# Patient Record
Sex: FEMALE
[De-identification: names, ages, dates, MRNs, and addresses within clinical notes are randomized per-mention and may not be internally consistent; named-entity substitution may affect disease eponyms.]

---

## 2020-02-24 ENCOUNTER — APPOINTMENT (OUTPATIENT)
Dept: SURGERY | Facility: CLINIC | Age: 65
End: 2020-02-24
Payer: COMMERCIAL

## 2020-02-24 VITALS
BODY MASS INDEX: 21.79 KG/M2 | SYSTOLIC BLOOD PRESSURE: 124 MMHG | HEART RATE: 85 BPM | HEIGHT: 63 IN | WEIGHT: 123 LBS | DIASTOLIC BLOOD PRESSURE: 82 MMHG | OXYGEN SATURATION: 98 % | TEMPERATURE: 98.6 F

## 2020-02-24 PROBLEM — Z00.00 ENCOUNTER FOR PREVENTIVE HEALTH EXAMINATION: Status: ACTIVE | Noted: 2020-02-24

## 2020-02-24 PROCEDURE — 99203 OFFICE O/P NEW LOW 30 MIN: CPT

## 2020-02-27 ENCOUNTER — APPOINTMENT (OUTPATIENT)
Dept: PLASTIC SURGERY | Facility: CLINIC | Age: 65
End: 2020-02-27
Payer: COMMERCIAL

## 2020-02-27 VITALS — OXYGEN SATURATION: 97 % | WEIGHT: 123 LBS | HEART RATE: 74 BPM | HEIGHT: 63 IN | BODY MASS INDEX: 21.79 KG/M2

## 2020-02-27 PROCEDURE — 99205 OFFICE O/P NEW HI 60 MIN: CPT

## 2020-02-28 NOTE — HISTORY OF PRESENT ILLNESS
[FreeTextEntry1] : 63 y/o F with newly diagnosed recurrent right breast cancer s/p b/l mastectomy with TE to implant reconstruction with textured silicone submuscular implants in 2014 is here to discuss removal of textured implants and capsulectomy and treatmetn of recurrent cancer.\par \par B/L implants in position, well healed transverse mastectomy scars, nipple reconstruction with tattoo b/l, no no seroma, no erythema, no induration\par \par Today we discussed options for removal of textured implants and capsulectomy with and without replacement of implants. We also discussed option for conversion to autologous tissue reconstruction. Patient is not a good candidate for autologous tissue reconstruction due to lack of donor site. She is most interested in removal of texture implants and capsulectomy without implant exchange. Nature of the surgery, risks, benefits, alternatives, expected postoperative course, recovery and long term results were reviewed. Patient motivated to proceed with surgery. Will coordinate with Dr. Wei. \par

## 2020-03-02 NOTE — HISTORY OF PRESENT ILLNESS
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 She is s/p b/l mastectomy, right SLNB (two sentinel lymph nodes were negative) with TE to implant reconstruction (Textured implants) in 2014. She palpated a right breast mass a few weeks ago and underwent an US guided right breast 12:00 4cmFN biopsy pathology revealed invasive poorly differentiated ductal carcinoma ER >90%, AK negative, HER-2 equivocal FISH negative.\par She reports 3 year h/o taking Arimidex, Femara, Tamoxifen but discontinued due to side effects. \par \par To note, patient is followed by Dr. Jojo Richardson (Med Onc at [facility]) and reports she had followed up with Dr. Richardson one day prior to her diagnosis of recurrence of breast cancer. She reports Dr. Richardson is aware of new diagnosis. Discussed recommendation for patient to follow up with her medical oncologist to determine if any further workup is indicated. \par \par Oncotype score was completed in 2014 with a score of 26, distant recurrence of 17%

## 2020-03-02 NOTE — DATA REVIEWED
[FreeTextEntry1] : 1/15/2020 ([facility])-- right 12:00 4cmFN US core biopsy pathology invasive ductal carcinoma, poorly differentiated \par \par 1/20/20 ([facility]) breast MRI showing left breast enhancing focus for which second look US recommended with biopsy of any suspicious US findings. If no US finding then clip placement under MRI guidance is recommended with surgical excision to follow. BIRADS 4 \par \par 1/28/20 ([facility]) PET chest abdomen and pelvis showing small focus of mild abnormal uptake in right breast abutting the surgical clip likely related to patients known malignancy. Mildly metabolic sub-CM left internal mammary lymph node that needs attention on follow up regarding further characterization. Suggestion of tiny left upper lobe nodule that is not fully evaluated in this examination. Further characterization is suggested with diagnostic chest CT. \par \par 1/30/20 ([facility]) Chest CT showing scattered indeterminate subcentimeter pulmonary nodules including a 2mm PAMELA nodule abutting the left fissure which was seen on prior PET/CT. Other subcentimeter nodules were difficult to visualized on prior PET/CT but were likely present. Continue CT surveillance recommended as per clinical protocol. A mildly hypermetabolic left internal thoracic lymph node was also better visualized on PET/CT. \par \par \par 2/19/20 ([facility]) right breast US showing right 12:00 4mcFN 8 x 5 x 7mm heterogeneous solid mass, unchanged in size and appearance. this is known biopsy proven carcinoma. Right axilla level 1 measuring 8 x 3 x 5mm lymph node present, likely a reactive lymph node. BIRADS 6

## 2020-03-02 NOTE — PAST MEDICAL HISTORY
[Menarche Age ____] : age at menarche was [unfilled] [Postmenopausal] : The patient is postmenopausal [Approximately ___] : the LMP was approximately [unfilled] [Total Preg ___] : G[unfilled] [Menopause Age____] : age at menopause was [unfilled] [Live Births ___] : P[unfilled]  [AB Spont ___] : miscarriages: [unfilled]  [History of Hormone Replacement Treatment] : has no history of hormone replacement treatment [FreeTextEntry5] : None [FreeTextEntry2] : 1 son and 1 daughter [FreeTextEntry6] : None [FreeTextEntry7] : None [FreeTextEntry8] : 6 months each child

## 2021-02-16 ENCOUNTER — TRANSCRIPTION ENCOUNTER (OUTPATIENT)
Age: 66
End: 2021-02-16

## 2021-04-06 ENCOUNTER — APPOINTMENT (OUTPATIENT)
Dept: PLASTIC SURGERY | Facility: CLINIC | Age: 66
End: 2021-04-06

## 2021-04-12 ENCOUNTER — TRANSCRIPTION ENCOUNTER (OUTPATIENT)
Age: 66
End: 2021-04-12

## 2021-04-19 ENCOUNTER — NON-APPOINTMENT (OUTPATIENT)
Age: 66
End: 2021-04-19

## 2021-04-27 ENCOUNTER — APPOINTMENT (OUTPATIENT)
Dept: PLASTIC SURGERY | Facility: CLINIC | Age: 66
End: 2021-04-27
Payer: COMMERCIAL

## 2021-04-27 VITALS — BODY MASS INDEX: 21.79 KG/M2 | HEART RATE: 73 BPM | WEIGHT: 123 LBS | OXYGEN SATURATION: 98 % | HEIGHT: 63 IN

## 2021-04-27 DIAGNOSIS — N65.0 DEFORMITY OF RECONSTRUCTED BREAST: ICD-10-CM

## 2021-04-27 DIAGNOSIS — Z85.3 PERSONAL HISTORY OF MALIGNANT NEOPLASM OF BREAST: ICD-10-CM

## 2021-04-27 PROCEDURE — 99072 ADDL SUPL MATRL&STAF TM PHE: CPT

## 2021-04-27 PROCEDURE — 99214 OFFICE O/P EST MOD 30 MIN: CPT

## 2021-04-27 RX ORDER — EXEMESTANE 25 MG/1
25 TABLET, FILM COATED ORAL
Refills: 0 | Status: ACTIVE | COMMUNITY

## 2021-04-27 NOTE — HISTORY OF PRESENT ILLNESS
[FreeTextEntry1] : 65 y/o F recurrent right breast cancer s/p b/l mastectomy with TE to implant reconstruction with textured silicone submuscular implants in 2014 at [facility]. She had recurrence of her breast cancer last year and had her implants removed 3/3/20 with TE recon at [facility] followed by radiation which she comppleted in June 2020. She had two expansions most recently was in February 2021. She had XRT to right breast. She is here today to discuss removal of TE and would like to hear options of reconstruction and without reconstruction. She has reconstructed nipples that have been tattooed. On Aromasin for ? 5 years.\par \par \par

## 2021-04-27 NOTE — ASSESSMENT
[FreeTextEntry1] : I reviewed with Ms. Sosa in detail the risks, benefits, and alternatives of both revision of implant based breast reconstruction, as well as delayed autologous tissue reconstruction.\par \par She has high grade capsular contracture and associated pain, discomfort. We discussed option of implant removal vs delayed autologous recon. \par \par I reviewed with her the details of delayed autologous tissue reconstruction, specifically using a free flap from her buttocks or inner thigh, such as a profunda artery  (PAP) flap reconstruction. The benefits of converting to autologous reconstruction are that it obviates the risk of future implant related complications. It also repositions the pectoralis muscle back onto the chest wall and creates a breast mound that will look more natural, feel more natural, and last forever without any concern for capsule contracture. Many patients with pain and discomfort from capsule contracture after radiation report improvement in their symptoms after delayed autologous reconstruction.\par \par I explained to her that the PAP flap entails transplanting the skin, the fat, and the blood vessels from the upper posterior thigh/inferior gluteal crease region to the chest wall, where we reconnect the artery and the vein using microvascular surgical techniques. This operation is approximately six hour for one side, and nine hours for bilateral reconstruction. There is a three day hospitalization and a six week recovery period. The risks include free flap failure, vascular compromise requiring return to the operating room, donor site scarring and potential delayed wound healing, wound dehiscence, suboptimal scarring or asymmetry associated with donor site.  There is also a possibility of contour abnormality and asymmetry between the two breasts. Finally, nipple areola reconstruction is performed at a later date as a separate procedure. If there is a free flap loss, we would likely place a tissue expander at the time returning to the operating room in order to preserve the breast skin envelope and perform a delayed reconstruction.\par

## 2021-04-27 NOTE — PHYSICAL EXAM
[de-identified] : b/l implants in place, b/l reconstructed nipples, well healed scar right breast from nipple recon, radiation induced fibrosis, left breast well healed scar [de-identified] : ND, NT, no masses, inadequate tissue for donor site  [de-identified] : adequate tissue for donor site, +skin laxity, +adipose tissue

## 2022-04-20 ENCOUNTER — TRANSCRIPTION ENCOUNTER (OUTPATIENT)
Age: 67
End: 2022-04-20

## 2023-10-04 ENCOUNTER — OFFICE (OUTPATIENT)
Dept: URBAN - METROPOLITAN AREA CLINIC 8 | Facility: CLINIC | Age: 68
Setting detail: OPHTHALMOLOGY
End: 2023-10-04
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
## 2023-10-04 PROCEDURE — NO SHOW FE NO SHOW FEE: Performed by: OPHTHALMOLOGY

## 2023-10-20 ENCOUNTER — OFFICE (OUTPATIENT)
Dept: URBAN - METROPOLITAN AREA CLINIC 8 | Facility: CLINIC | Age: 68
Setting detail: OPHTHALMOLOGY
End: 2023-10-20
Payer: MEDICARE

## 2023-10-20 DIAGNOSIS — H52.4: ICD-10-CM

## 2023-10-20 DIAGNOSIS — H25.13: ICD-10-CM

## 2023-10-20 DIAGNOSIS — Q10.7: ICD-10-CM

## 2023-10-20 DIAGNOSIS — H57.813: ICD-10-CM

## 2023-10-20 DIAGNOSIS — H02.834: ICD-10-CM

## 2023-10-20 DIAGNOSIS — H02.831: ICD-10-CM

## 2023-10-20 PROCEDURE — 92014 COMPRE OPH EXAM EST PT 1/>: CPT

## 2023-10-20 PROCEDURE — 92015 DETERMINE REFRACTIVE STATE: CPT

## 2023-10-20 ASSESSMENT — SPHEQUIV_DERIVED
OS_SPHEQUIV: 2.125
OD_SPHEQUIV: 1.625
OD_SPHEQUIV: 1.125
OS_SPHEQUIV: 1.625
OS_SPHEQUIV: 1.625
OD_SPHEQUIV: 1.125

## 2023-10-20 ASSESSMENT — KERATOMETRY
OS_K2POWER_DIOPTERS: 46.00
OS_AXISANGLE_DEGREES: 085
OD_K2POWER_DIOPTERS: 45.75
METHOD_AUTO_MANUAL: AUTO
OD_K1POWER_DIOPTERS: 45.25
OD_AXISANGLE_DEGREES: 069
OS_K1POWER_DIOPTERS: 45.75

## 2023-10-20 ASSESSMENT — REFRACTION_MANIFEST
OU_VA: 20/20
OD_SPHERE: +1.25
OS_CYLINDER: -0.25
OD_VA2: 20/20
OD_VA1: 20/20
OS_VA2: 20/20
OS_ADD: +2.50
OS_CYLINDER: -0.25
OD_SPHERE: +1.25
OS_VA1: 20/20
OD_VA1: 20/20
OS_SPHERE: +1.75
OD_AXIS: 090
OS_AXIS: 105
OD_AXIS: 090
OS_SPHERE: +1.75
OU_VA: 20/20
OD_CYLINDER: -0.25
OS_VA2: 20/20
OS_VA1: 20/20
OD_VA2: 20/20
OS_AXIS: 105
OD_ADD: +2.50
OD_ADD: +2.50
OD_CYLINDER: -0.25
OS_ADD: +2.50

## 2023-10-20 ASSESSMENT — REFRACTION_CURRENTRX
OS_VPRISM_DIRECTION: SV
OD_CYLINDER: SPHERE
OD_VPRISM_DIRECTION: SV
OS_OVR_VA: 20/
OS_SPHERE: +2.75
OS_CYLINDER: SPHERE
OD_OVR_VA: 20/
OD_SPHERE: +2.75

## 2023-10-20 ASSESSMENT — REFRACTION_AUTOREFRACTION
OD_SPHERE: +2.00
OD_AXIS: 091
OD_CYLINDER: -0.75
OS_CYLINDER: -0.75
OS_SPHERE: +2.50
OS_AXIS: 105

## 2023-10-20 ASSESSMENT — AXIALLENGTH_DERIVED
OS_AL: 22.1767
OD_AL: 22.2991
OD_AL: 22.4745
OS_AL: 22.0059
OD_AL: 22.4745
OS_AL: 22.1767

## 2023-10-20 ASSESSMENT — VISUAL ACUITY
OD_BCVA: 20/50-2
OS_BCVA: 20/50

## 2023-10-20 ASSESSMENT — CONFRONTATIONAL VISUAL FIELD TEST (CVF)
OS_FINDINGS: FULL
OD_FINDINGS: FULL

## 2023-10-20 ASSESSMENT — LID POSITION - DERMATOCHALASIS
OS_DERMATOCHALASIS: ABSENT
OD_DERMATOCHALASIS: ABSENT

## 2023-11-17 ENCOUNTER — OFFICE (OUTPATIENT)
Dept: URBAN - METROPOLITAN AREA CLINIC 38 | Facility: CLINIC | Age: 68
Setting detail: OPHTHALMOLOGY
End: 2023-11-17
Payer: MEDICARE

## 2023-11-17 DIAGNOSIS — H02.834: ICD-10-CM

## 2023-11-17 DIAGNOSIS — H57.813: ICD-10-CM

## 2023-11-17 DIAGNOSIS — H02.831: ICD-10-CM

## 2023-11-17 DIAGNOSIS — Q10.7: ICD-10-CM

## 2023-11-17 PROCEDURE — SCCOS COSMETIC CONSULTATION: Performed by: OPHTHALMOLOGY

## 2023-11-17 PROCEDURE — 99213 OFFICE O/P EST LOW 20 MIN: CPT | Performed by: OPHTHALMOLOGY

## 2023-11-17 ASSESSMENT — REFRACTION_AUTOREFRACTION
OD_SPHERE: +2.00
OS_AXIS: 105
OS_CYLINDER: -0.75
OS_SPHERE: +2.50
OD_AXIS: 091
OD_CYLINDER: -0.75

## 2023-11-17 ASSESSMENT — CONFRONTATIONAL VISUAL FIELD TEST (CVF)
OD_FINDINGS: FULL
OS_FINDINGS: FULL

## 2023-11-17 ASSESSMENT — SPHEQUIV_DERIVED
OS_SPHEQUIV: 2.125
OD_SPHEQUIV: 1.625

## 2023-11-17 ASSESSMENT — LID POSITION - DERMATOCHALASIS
OS_DERMATOCHALASIS: ABSENT
OD_DERMATOCHALASIS: ABSENT

## 2023-11-18 PROBLEM — H02.83 DERMATOCHALASIS; RIGHT UPPER LID, LEFT UPPER LID: Status: ACTIVE | Noted: 2023-11-17

## 2024-07-16 ENCOUNTER — OFFICE (OUTPATIENT)
Dept: URBAN - METROPOLITAN AREA CLINIC 8 | Facility: CLINIC | Age: 69
Setting detail: OPHTHALMOLOGY
End: 2024-07-16
Payer: MEDICARE

## 2024-07-16 DIAGNOSIS — H00.14: ICD-10-CM

## 2024-07-16 PROCEDURE — 99213 OFFICE O/P EST LOW 20 MIN: CPT

## 2024-07-16 ASSESSMENT — CONFRONTATIONAL VISUAL FIELD TEST (CVF)
OS_FINDINGS: FULL
OD_FINDINGS: FULL

## 2024-08-02 ENCOUNTER — OFFICE (OUTPATIENT)
Dept: URBAN - METROPOLITAN AREA CLINIC 8 | Facility: CLINIC | Age: 69
Setting detail: OPHTHALMOLOGY
End: 2024-08-02
Payer: MEDICARE

## 2024-08-02 DIAGNOSIS — H52.4: ICD-10-CM

## 2024-08-02 DIAGNOSIS — Q10.7: ICD-10-CM

## 2024-08-02 DIAGNOSIS — H25.13: ICD-10-CM

## 2024-08-02 PROCEDURE — 92015 DETERMINE REFRACTIVE STATE: CPT | Performed by: OPHTHALMOLOGY

## 2024-08-02 PROCEDURE — 92014 COMPRE OPH EXAM EST PT 1/>: CPT | Performed by: OPHTHALMOLOGY

## 2024-08-02 ASSESSMENT — CONFRONTATIONAL VISUAL FIELD TEST (CVF)
OD_FINDINGS: FULL
OS_FINDINGS: FULL